# Patient Record
Sex: FEMALE | URBAN - METROPOLITAN AREA
[De-identification: names, ages, dates, MRNs, and addresses within clinical notes are randomized per-mention and may not be internally consistent; named-entity substitution may affect disease eponyms.]

---

## 2024-11-20 ENCOUNTER — CLAIMS CREATED FROM THE CLAIM WINDOW (OUTPATIENT)
Dept: URBAN - METROPOLITAN AREA SURGERY CENTER 4 | Facility: SURGERY CENTER | Age: 54
End: 2024-11-20
Payer: OTHER GOVERNMENT

## 2024-11-20 DIAGNOSIS — K63.5 COLORECTAL POLYP DETECTED ON COLONOSCOPY: ICD-10-CM

## 2024-11-20 DIAGNOSIS — K31.7 GASTRIC POLYPS: ICD-10-CM

## 2024-11-20 DIAGNOSIS — K64.1 SECOND DEGREE HEMORRHOIDS: ICD-10-CM

## 2024-11-20 DIAGNOSIS — K63.5 POLYP OF ASCENDING COLON, UNSPECIFIED TYPE: ICD-10-CM

## 2024-11-20 DIAGNOSIS — Z86.0100 PERSONAL HISTORY OF COLONIC POLYPS: ICD-10-CM

## 2024-11-20 DIAGNOSIS — Z80.0 FAMILY HISTORY OF COLON CANCER: ICD-10-CM

## 2024-11-20 DIAGNOSIS — Z12.11 ENCOUNTER FOR SCREENING FOR MALIGNANT NEOPLASM OF COLON: ICD-10-CM

## 2024-11-20 DIAGNOSIS — K29.70 GASTRITIS WITHOUT BLEEDING, UNSPECIFIED CHRONICITY, UNSPECIFIED GASTRITIS TYPE: ICD-10-CM

## 2024-11-20 DIAGNOSIS — K22.89 OTHER SPECIFIED DISEASE OF ESOPHAGUS: ICD-10-CM

## 2024-11-20 DIAGNOSIS — K57.30 DIVERTICULOSIS OF LARGE INTESTINE WITHOUT PERFORATION OR ABSCESS WITHOUT BLEEDING: ICD-10-CM

## 2024-11-20 DIAGNOSIS — K29.70 ERYTHEMATOUS GASTROPATHY: ICD-10-CM

## 2024-11-20 DIAGNOSIS — K26.9 DUODENAL ULCER, UNSPECIFIED AS ACUTE OR CHRONIC, WITHOUT HEMORRHAGE OR PERFORATION: ICD-10-CM

## 2024-11-20 PROCEDURE — 43239 EGD BIOPSY SINGLE/MULTIPLE: CPT | Performed by: INTERNAL MEDICINE

## 2024-11-20 PROCEDURE — G9999 DOC SYS RSN <3 COLON: HCPCS | Performed by: INTERNAL MEDICINE

## 2024-11-20 PROCEDURE — 45380 COLONOSCOPY AND BIOPSY: CPT | Performed by: INTERNAL MEDICINE

## 2024-11-20 PROCEDURE — 45385 COLONOSCOPY W/LESION REMOVAL: CPT | Performed by: INTERNAL MEDICINE

## 2024-11-20 PROCEDURE — 00813 ANES UPR LWR GI NDSC PX: CPT | Performed by: NURSE ANESTHETIST, CERTIFIED REGISTERED

## 2024-11-20 RX ORDER — LORATADINE 10 MG/1
1 CAPSULE CAPSULE, LIQUID FILLED ORAL ONCE A DAY
Status: ACTIVE | COMMUNITY

## 2024-11-20 RX ORDER — FAMOTIDINE 40 MG/1
1 TABLET TABLET, FILM COATED ORAL ONCE A DAY
Qty: 30 | Refills: 3 | Status: ACTIVE | COMMUNITY
Start: 2024-08-13

## 2024-11-20 RX ORDER — MULTIVITAMIN WITH IRON
AS DIRECTED TABLET ORAL
Status: ACTIVE | COMMUNITY

## 2024-12-04 ENCOUNTER — OFFICE VISIT (OUTPATIENT)
Dept: URBAN - METROPOLITAN AREA CLINIC 60 | Facility: CLINIC | Age: 54
End: 2024-12-04
Payer: OTHER GOVERNMENT

## 2024-12-04 VITALS
TEMPERATURE: 97.3 F | RESPIRATION RATE: 20 BRPM | OXYGEN SATURATION: 96 % | HEART RATE: 70 BPM | BODY MASS INDEX: 38.41 KG/M2 | SYSTOLIC BLOOD PRESSURE: 110 MMHG | WEIGHT: 239 LBS | HEIGHT: 66 IN | DIASTOLIC BLOOD PRESSURE: 64 MMHG

## 2024-12-04 DIAGNOSIS — K26.9 DUODENAL ULCER: ICD-10-CM

## 2024-12-04 DIAGNOSIS — K21.00 ALKALINE REFLUX ESOPHAGITIS: ICD-10-CM

## 2024-12-04 DIAGNOSIS — R10.12 LUQ PAIN: ICD-10-CM

## 2024-12-04 DIAGNOSIS — D12.6 TUBULAR ADENOMA OF COLON: ICD-10-CM

## 2024-12-04 PROBLEM — 266433003: Status: ACTIVE | Noted: 2024-12-04

## 2024-12-04 PROBLEM — 51868009: Status: ACTIVE | Noted: 2024-12-04

## 2024-12-04 PROCEDURE — 99214 OFFICE O/P EST MOD 30 MIN: CPT

## 2024-12-04 RX ORDER — MULTIVITAMIN WITH IRON
AS DIRECTED TABLET ORAL
Status: ACTIVE | COMMUNITY

## 2024-12-04 RX ORDER — FAMOTIDINE 40 MG/1
1 TABLET TABLET, FILM COATED ORAL ONCE A DAY
Qty: 30 | Refills: 3 | Status: ACTIVE | COMMUNITY
Start: 2024-08-13

## 2024-12-04 RX ORDER — OMEPRAZOLE 40 MG/1
1 CAPSULE 1/2 TO 1 HOUR BEFORE MORNING MEAL CAPSULE, DELAYED RELEASE ORAL ONCE A DAY
Qty: 30 | Refills: 3 | OUTPATIENT
Start: 2024-12-04

## 2024-12-04 RX ORDER — LORATADINE 10 MG
1 CAPSULE TABLET,DISINTEGRATING ORAL ONCE A DAY
Status: ACTIVE | COMMUNITY

## 2024-12-04 NOTE — HPI-TODAY'S VISIT:
Patient coming in for EGD/colonoscopy f/u.  We reviewed results of today's visit.  Patient still has been off of all NSAIDs.  She is feeling better with her left upper quadrant pain mostly resolved since starting Pepcid and being off NSAIDs.  Still some discomfort when she lies on her left side.  We reviewed the results of today's visit and patient will be started on omeprazole 40 mg once daily.  Bowel movements are regular and formed once to twice daily.  Patient will repeat colonoscopy in 3 years.  No other GI complaints.  Last OV 2024: Patient is a 54-year-old female referred by PCP for history of colon polyps and needing updated colonoscopy.  No pertinent past medical history. She complains today of LUQ pain and excessive belching. Patient with symptoms of LUQ over 15 years. She previously was taking ibuprofen for 15 years as well and d/c last week and feeling better after she stopped taking it. She was taking ibuprofen every other day more or less for 15 years and was never told that it could be causing her symptoms. Patient still with mild discomfort on that side and belching when pushed. She still has LUQ pain when lying on that side at night. She takes omeprazole OTC prn with some help with symptoms. Last EGD/colonoscopy 12 years ago. History of multiple colon polyps. Sister with history of colon cancer  at 66. Bowels are firm and regular. No signs of GI bleeding. Denies reflux, dysphagia, n/v, cough, throat clearing.  Denies any brbpr, melena, unintentional weight loss, early satiety, fever, chills, diarrhea, constipation, dysphagia, odynophagia, or reflux.   Denies any use of blood thinners, presence of pacemaker or defib. Denies history of sleep apnea. No prior MI, TIA, CVA or cardiac stenting. No known cardiac or pulmonary issues.

## 2024-12-04 NOTE — HPI-PREVIOUS PROCEDURES
EGD 11/20/2024 by Dr. Davis showing esophageal mucosal changes suspicious for EOE, Zargo caustic ingestion injury grade 1 reflux esophagitis, multiple gastric polyps, nonerosive gastritis, nonbleeding duodenal ulcers and normal second portion of duodenum.  Duodenal biopsy with chronic duodenitis.  Antral stomach biopsy with foveolar hyperplasia negative for H. pylori.  GE junction biopsy with reflux type changes negative for Mcknight's or EOE.  Proximal esophageal biopsy with no significant abnormality seen.  Colonoscopy 11/20/2024 by Dr. Davis with normal terminal ileum, small 4 to 6 mm polyp in the cecum TA, medium 7 to 9 mm polyp in proximal ascending colon benign mucosal polyp, two 3 mm polyps in the transverse colon TA, 2 small 4-6 mm polyps in  descending colon TA, diverticulosis, external and internal hemorrhoids.  Repeat colonoscopy 3 years.

## 2024-12-16 ENCOUNTER — ERX REFILL RESPONSE (OUTPATIENT)
Dept: URBAN - METROPOLITAN AREA CLINIC 60 | Facility: CLINIC | Age: 54
End: 2024-12-16

## 2024-12-16 RX ORDER — FAMOTIDINE 40 MG/1
1 TABLET TABLET, FILM COATED ORAL ONCE A DAY
Qty: 30 | Refills: 3 | OUTPATIENT

## 2024-12-16 RX ORDER — FAMOTIDINE 40 MG/1
TAKE 1 TABLET BY MOUTH EVERY DAY FOR 30 DAYS TABLET, FILM COATED ORAL
Qty: 30 TABLET | Refills: 3 | OUTPATIENT

## 2025-03-04 ENCOUNTER — OFFICE VISIT (OUTPATIENT)
Dept: URBAN - METROPOLITAN AREA CLINIC 60 | Facility: CLINIC | Age: 55
End: 2025-03-04
Payer: OTHER GOVERNMENT

## 2025-03-04 VITALS
HEART RATE: 68 BPM | HEIGHT: 66 IN | OXYGEN SATURATION: 96 % | WEIGHT: 243.4 LBS | SYSTOLIC BLOOD PRESSURE: 132 MMHG | TEMPERATURE: 98.8 F | BODY MASS INDEX: 39.12 KG/M2 | RESPIRATION RATE: 12 BRPM | DIASTOLIC BLOOD PRESSURE: 72 MMHG

## 2025-03-04 DIAGNOSIS — K21.00 GASTROESOPHAGEAL REFLUX DISEASE WITH ESOPHAGITIS WITHOUT HEMORRHAGE: ICD-10-CM

## 2025-03-04 DIAGNOSIS — R10.12 LUQ PAIN: ICD-10-CM

## 2025-03-04 PROCEDURE — 99213 OFFICE O/P EST LOW 20 MIN: CPT

## 2025-03-04 RX ORDER — FAMOTIDINE 40 MG/1
TAKE 1 TABLET BY MOUTH EVERY DAY FOR 30 DAYS TABLET, FILM COATED ORAL
Qty: 30 TABLET | Refills: 3 | Status: ACTIVE | COMMUNITY

## 2025-03-04 RX ORDER — MULTIVITAMIN WITH IRON
AS DIRECTED TABLET ORAL
Status: ACTIVE | COMMUNITY

## 2025-03-04 RX ORDER — LORATADINE 10 MG
1 CAPSULE TABLET,DISINTEGRATING ORAL ONCE A DAY
Status: ACTIVE | COMMUNITY

## 2025-03-04 NOTE — HPI-TODAY'S VISIT:
Patient coming in for 3-month follow-up.  She feels well.  She completed the omeprazole 40 mg once daily that was prescribed to her for 3 months.  She has discontinued medication because she started to "feel weird on it".  She is now on famotidine 40 mg once daily with complete resolution of previous LUQ pain.  No breakthrough reflux symptoms.  No GI complaints.  Last OV 2024: Patient coming in for EGD/colonoscopy f/u.  We reviewed results of today's visit.  Patient still has been off of all NSAIDs.  She is feeling better with her left upper quadrant pain mostly resolved since starting Pepcid and being off NSAIDs.  Still some discomfort when she lies on her left side.  We reviewed the results of today's visit and patient will be started on omeprazole 40 mg once daily.  Bowel movements are regular and formed once to twice daily.  Patient will repeat colonoscopy in 3 years.  No other GI complaints.  Last OV 2024: Patient is a 54-year-old female referred by PCP for history of colon polyps and needing updated colonoscopy.  No pertinent past medical history. She complains today of LUQ pain and excessive belching. Patient with symptoms of LUQ over 15 years. She previously was taking ibuprofen for 15 years as well and d/c last week and feeling better after she stopped taking it. She was taking ibuprofen every other day more or less for 15 years and was never told that it could be causing her symptoms. Patient still with mild discomfort on that side and belching when pushed. She still has LUQ pain when lying on that side at night. She takes omeprazole OTC prn with some help with symptoms. Last EGD/colonoscopy 12 years ago. History of multiple colon polyps. Sister with history of colon cancer  at 66. Bowels are firm and regular. No signs of GI bleeding. Denies reflux, dysphagia, n/v, cough, throat clearing.  Denies any brbpr, melena, unintentional weight loss, early satiety, fever, chills, diarrhea, constipation, dysphagia, odynophagia, or reflux.   Denies any use of blood thinners, presence of pacemaker or defib. Denies history of sleep apnea. No prior MI, TIA, CVA or cardiac stenting. No known cardiac or pulmonary issues.

## 2025-05-07 ENCOUNTER — ERX REFILL RESPONSE (OUTPATIENT)
Dept: URBAN - METROPOLITAN AREA CLINIC 60 | Facility: CLINIC | Age: 55
End: 2025-05-07

## 2025-05-07 RX ORDER — FAMOTIDINE 40 MG/1
TAKE 1 TABLET BY MOUTH EVERY DAY TABLET ORAL
Qty: 30 TABLET | Refills: 3

## 2025-06-04 ENCOUNTER — OFFICE VISIT (OUTPATIENT)
Dept: URBAN - METROPOLITAN AREA CLINIC 60 | Facility: CLINIC | Age: 55
End: 2025-06-04
Payer: OTHER GOVERNMENT

## 2025-06-04 DIAGNOSIS — K21.00 GASTROESOPHAGEAL REFLUX DISEASE WITH ESOPHAGITIS WITHOUT HEMORRHAGE: ICD-10-CM

## 2025-06-04 DIAGNOSIS — R10.12 LUQ PAIN: ICD-10-CM

## 2025-06-04 DIAGNOSIS — Z86.0101 H/O ADENOMATOUS POLYP OF COLON: ICD-10-CM

## 2025-06-04 PROCEDURE — 99214 OFFICE O/P EST MOD 30 MIN: CPT

## 2025-06-04 RX ORDER — MULTIVITAMIN WITH IRON
AS DIRECTED TABLET ORAL
Status: ACTIVE | COMMUNITY

## 2025-06-04 RX ORDER — FAMOTIDINE 40 MG/1
TAKE 1 TABLET BY MOUTH EVERY DAY TABLET ORAL
Qty: 30 TABLET | Refills: 3 | Status: ACTIVE | COMMUNITY

## 2025-06-04 RX ORDER — LORATADINE 10 MG
1 CAPSULE TABLET,DISINTEGRATING ORAL ONCE A DAY
Status: ACTIVE | COMMUNITY

## 2025-06-04 NOTE — HPI-TODAY'S VISIT:
Patient coming in for follow-up.  She continues to feel great and is compliant with famotidine 40 mg once daily.  Her left upper quadrant pain has resolved.  No new GI complaints.  Denies any reflux breakthrough symptoms.  Due for repeat colonoscopy 2027.  OV 3/2025: Patient coming in for 3-month follow-up.  She feels well.  She completed the omeprazole 40 mg once daily that was prescribed to her for 3 months.  She has discontinued medication because she started to "feel weird on it".  She is now on famotidine 40 mg once daily with complete resolution of previous LUQ pain.  No breakthrough reflux symptoms.  No GI complaints.  Last OV 2024: Patient coming in for EGD/colonoscopy f/u.  We reviewed results of today's visit.  Patient still has been off of all NSAIDs.  She is feeling better with her left upper quadrant pain mostly resolved since starting Pepcid and being off NSAIDs.  Still some discomfort when she lies on her left side.  We reviewed the results of today's visit and patient will be started on omeprazole 40 mg once daily.  Bowel movements are regular and formed once to twice daily.  Patient will repeat colonoscopy in 3 years.  No other GI complaints.  Last OV 2024: Patient is a 54-year-old female referred by PCP for history of colon polyps and needing updated colonoscopy.  No pertinent past medical history. She complains today of LUQ pain and excessive belching. Patient with symptoms of LUQ over 15 years. She previously was taking ibuprofen for 15 years as well and d/c last week and feeling better after she stopped taking it. She was taking ibuprofen every other day more or less for 15 years and was never told that it could be causing her symptoms. Patient still with mild discomfort on that side and belching when pushed. She still has LUQ pain when lying on that side at night. She takes omeprazole OTC prn with some help with symptoms. Last EGD/colonoscopy 12 years ago. History of multiple colon polyps. Sister with history of colon cancer  at 66. Bowels are firm and regular. No signs of GI bleeding. Denies reflux, dysphagia, n/v, cough, throat clearing.  Denies any brbpr, melena, unintentional weight loss, early satiety, fever, chills, diarrhea, constipation, dysphagia, odynophagia, or reflux.   Denies any use of blood thinners, presence of pacemaker or defib. Denies history of sleep apnea. No prior MI, TIA, CVA or cardiac stenting. No known cardiac or pulmonary issues.